# Patient Record
Sex: FEMALE | Race: WHITE | NOT HISPANIC OR LATINO | URBAN - METROPOLITAN AREA
[De-identification: names, ages, dates, MRNs, and addresses within clinical notes are randomized per-mention and may not be internally consistent; named-entity substitution may affect disease eponyms.]

---

## 2017-02-08 ENCOUNTER — OFFICE VISIT - HEALTHEAST (OUTPATIENT)
Dept: INTERNAL MEDICINE | Facility: CLINIC | Age: 32
End: 2017-02-08

## 2017-02-08 DIAGNOSIS — J01.00 ACUTE NON-RECURRENT MAXILLARY SINUSITIS: ICD-10-CM

## 2017-09-07 ENCOUNTER — OFFICE VISIT - HEALTHEAST (OUTPATIENT)
Dept: INTERNAL MEDICINE | Facility: CLINIC | Age: 32
End: 2017-09-07

## 2017-09-07 ENCOUNTER — RECORDS - HEALTHEAST (OUTPATIENT)
Dept: GENERAL RADIOLOGY | Facility: CLINIC | Age: 32
End: 2017-09-07

## 2017-09-07 DIAGNOSIS — J40 BRONCHITIS: ICD-10-CM

## 2017-09-07 DIAGNOSIS — J40 BRONCHITIS, NOT SPECIFIED AS ACUTE OR CHRONIC: ICD-10-CM

## 2018-01-19 ENCOUNTER — COMMUNICATION - HEALTHEAST (OUTPATIENT)
Dept: SCHEDULING | Facility: CLINIC | Age: 33
End: 2018-01-19

## 2018-01-19 ENCOUNTER — OFFICE VISIT - HEALTHEAST (OUTPATIENT)
Dept: INTERNAL MEDICINE | Facility: CLINIC | Age: 33
End: 2018-01-19

## 2018-01-19 DIAGNOSIS — J02.9 SORE THROAT: ICD-10-CM

## 2018-01-19 LAB — DEPRECATED S PYO AG THROAT QL EIA: ABNORMAL

## 2018-01-22 ENCOUNTER — RECORDS - HEALTHEAST (OUTPATIENT)
Dept: GENERAL RADIOLOGY | Facility: CLINIC | Age: 33
End: 2018-01-22

## 2018-01-22 ENCOUNTER — OFFICE VISIT - HEALTHEAST (OUTPATIENT)
Dept: INTERNAL MEDICINE | Facility: CLINIC | Age: 33
End: 2018-01-22

## 2018-01-22 ENCOUNTER — COMMUNICATION - HEALTHEAST (OUTPATIENT)
Dept: INTERNAL MEDICINE | Facility: CLINIC | Age: 33
End: 2018-01-22

## 2018-01-22 DIAGNOSIS — R05.9 COUGH: ICD-10-CM

## 2018-01-22 LAB
FLUAV AG SPEC QL IA: NORMAL
FLUBV AG SPEC QL IA: NORMAL

## 2018-01-23 ENCOUNTER — COMMUNICATION - HEALTHEAST (OUTPATIENT)
Dept: INTERNAL MEDICINE | Facility: CLINIC | Age: 33
End: 2018-01-23

## 2018-08-03 ENCOUNTER — OFFICE VISIT - HEALTHEAST (OUTPATIENT)
Dept: FAMILY MEDICINE | Facility: CLINIC | Age: 33
End: 2018-08-03

## 2018-08-03 DIAGNOSIS — M54.9 BACK PAIN: ICD-10-CM

## 2018-08-03 LAB
ALBUMIN UR-MCNC: NEGATIVE MG/DL
APPEARANCE UR: CLEAR
BASOPHILS # BLD AUTO: 0 THOU/UL (ref 0–0.2)
BASOPHILS NFR BLD AUTO: 1 % (ref 0–2)
BILIRUB UR QL STRIP: NEGATIVE
COLOR UR AUTO: YELLOW
EOSINOPHIL # BLD AUTO: 0.1 THOU/UL (ref 0–0.4)
EOSINOPHIL NFR BLD AUTO: 2 % (ref 0–6)
ERYTHROCYTE [DISTWIDTH] IN BLOOD BY AUTOMATED COUNT: 11.6 % (ref 11–14.5)
GLUCOSE UR STRIP-MCNC: NEGATIVE MG/DL
HCG UR QL: NEGATIVE
HCT VFR BLD AUTO: 39.4 % (ref 35–47)
HGB BLD-MCNC: 13.4 G/DL (ref 12–16)
HGB UR QL STRIP: NEGATIVE
KETONES UR STRIP-MCNC: NEGATIVE MG/DL
LEUKOCYTE ESTERASE UR QL STRIP: NEGATIVE
LYMPHOCYTES # BLD AUTO: 1.7 THOU/UL (ref 0.8–4.4)
LYMPHOCYTES NFR BLD AUTO: 36 % (ref 20–40)
MCH RBC QN AUTO: 31.2 PG (ref 27–34)
MCHC RBC AUTO-ENTMCNC: 34 G/DL (ref 32–36)
MCV RBC AUTO: 92 FL (ref 80–100)
MONOCYTES # BLD AUTO: 0.3 THOU/UL (ref 0–0.9)
MONOCYTES NFR BLD AUTO: 7 % (ref 2–10)
NEUTROPHILS # BLD AUTO: 2.6 THOU/UL (ref 2–7.7)
NEUTROPHILS NFR BLD AUTO: 55 % (ref 50–70)
NITRATE UR QL: NEGATIVE
PH UR STRIP: 7.5 [PH] (ref 5–8)
PLATELET # BLD AUTO: 294 THOU/UL (ref 140–440)
PMV BLD AUTO: 8.1 FL (ref 7–10)
RBC # BLD AUTO: 4.28 MILL/UL (ref 3.8–5.4)
SP GR UR STRIP: 1.01 (ref 1–1.03)
SP GR UR STRIP: 1.01 (ref 1–1.03)
UROBILINOGEN UR STRIP-ACNC: NORMAL
WBC: 4.7 THOU/UL (ref 4–11)

## 2018-08-04 LAB — BACTERIA SPEC CULT: NO GROWTH

## 2018-08-06 ENCOUNTER — COMMUNICATION - HEALTHEAST (OUTPATIENT)
Dept: SCHEDULING | Facility: CLINIC | Age: 33
End: 2018-08-06

## 2019-04-12 ENCOUNTER — ANESTHESIA - HEALTHEAST (OUTPATIENT)
Dept: OBGYN | Facility: CLINIC | Age: 34
End: 2019-04-12

## 2019-04-12 ENCOUNTER — SURGERY - HEALTHEAST (OUTPATIENT)
Dept: OBGYN | Facility: CLINIC | Age: 34
End: 2019-04-12

## 2019-04-12 ASSESSMENT — MIFFLIN-ST. JEOR: SCORE: 1549.97

## 2019-04-15 ENCOUNTER — HOME CARE/HOSPICE - HEALTHEAST (OUTPATIENT)
Dept: HOME HEALTH SERVICES | Facility: HOME HEALTH | Age: 34
End: 2019-04-15

## 2019-04-16 ENCOUNTER — COMMUNICATION - HEALTHEAST (OUTPATIENT)
Dept: OBGYN | Facility: CLINIC | Age: 34
End: 2019-04-16

## 2019-07-03 ENCOUNTER — OFFICE VISIT - HEALTHEAST (OUTPATIENT)
Dept: INTERNAL MEDICINE | Facility: CLINIC | Age: 34
End: 2019-07-03

## 2019-07-03 DIAGNOSIS — R10.84 ABDOMINAL PAIN, GENERALIZED: ICD-10-CM

## 2019-07-03 DIAGNOSIS — R19.7 DIARRHEA, UNSPECIFIED TYPE: ICD-10-CM

## 2019-07-03 LAB
ALBUMIN SERPL-MCNC: 4.3 G/DL (ref 3.5–5)
ALP SERPL-CCNC: 63 U/L (ref 45–120)
ALT SERPL W P-5'-P-CCNC: 33 U/L (ref 0–45)
ANION GAP SERPL CALCULATED.3IONS-SCNC: 10 MMOL/L (ref 5–18)
AST SERPL W P-5'-P-CCNC: 19 U/L (ref 0–40)
BASOPHILS # BLD AUTO: 0 THOU/UL (ref 0–0.2)
BASOPHILS NFR BLD AUTO: 1 % (ref 0–2)
BILIRUB SERPL-MCNC: 0.4 MG/DL (ref 0–1)
BUN SERPL-MCNC: 15 MG/DL (ref 8–22)
CALCIUM SERPL-MCNC: 10.6 MG/DL (ref 8.5–10.5)
CHLORIDE BLD-SCNC: 106 MMOL/L (ref 98–107)
CO2 SERPL-SCNC: 27 MMOL/L (ref 22–31)
CREAT SERPL-MCNC: 0.8 MG/DL (ref 0.6–1.1)
EOSINOPHIL # BLD AUTO: 0.1 THOU/UL (ref 0–0.4)
EOSINOPHIL NFR BLD AUTO: 2 % (ref 0–6)
ERYTHROCYTE [DISTWIDTH] IN BLOOD BY AUTOMATED COUNT: 11.6 % (ref 11–14.5)
GFR SERPL CREATININE-BSD FRML MDRD: >60 ML/MIN/1.73M2
GLUCOSE BLD-MCNC: 94 MG/DL (ref 70–125)
HCT VFR BLD AUTO: 43.3 % (ref 35–47)
HGB BLD-MCNC: 14.5 G/DL (ref 12–16)
LYMPHOCYTES # BLD AUTO: 2 THOU/UL (ref 0.8–4.4)
LYMPHOCYTES NFR BLD AUTO: 28 % (ref 20–40)
MCH RBC QN AUTO: 30.5 PG (ref 27–34)
MCHC RBC AUTO-ENTMCNC: 33.5 G/DL (ref 32–36)
MCV RBC AUTO: 91 FL (ref 80–100)
MONOCYTES # BLD AUTO: 0.5 THOU/UL (ref 0–0.9)
MONOCYTES NFR BLD AUTO: 7 % (ref 2–10)
NEUTROPHILS # BLD AUTO: 4.3 THOU/UL (ref 2–7.7)
NEUTROPHILS NFR BLD AUTO: 63 % (ref 50–70)
PLATELET # BLD AUTO: 289 THOU/UL (ref 140–440)
PMV BLD AUTO: 10.6 FL (ref 8.5–12.5)
POTASSIUM BLD-SCNC: 3.9 MMOL/L (ref 3.5–5)
PROT SERPL-MCNC: 7.5 G/DL (ref 6–8)
RBC # BLD AUTO: 4.75 MILL/UL (ref 3.8–5.4)
SODIUM SERPL-SCNC: 143 MMOL/L (ref 136–145)
WBC: 6.9 THOU/UL (ref 4–11)

## 2019-07-03 RX ORDER — CHLORAL HYDRATE 500 MG
2 CAPSULE ORAL DAILY
Status: SHIPPED | COMMUNITY
Start: 2019-07-03

## 2019-07-08 ENCOUNTER — COMMUNICATION - HEALTHEAST (OUTPATIENT)
Dept: INTERNAL MEDICINE | Facility: CLINIC | Age: 34
End: 2019-07-08

## 2019-07-08 LAB
GLIADIN IGA SER-ACNC: 1.2 U/ML
GLIADIN IGG SER-ACNC: <0.4 U/ML
IGA SERPL-MCNC: 196 MG/DL (ref 65–400)
TTG IGA SER-ACNC: 0.3 U/ML
TTG IGG SER-ACNC: <0.6 U/ML

## 2019-07-26 ENCOUNTER — OFFICE VISIT - HEALTHEAST (OUTPATIENT)
Dept: FAMILY MEDICINE | Facility: CLINIC | Age: 34
End: 2019-07-26

## 2019-07-26 ENCOUNTER — COMMUNICATION - HEALTHEAST (OUTPATIENT)
Dept: SCHEDULING | Facility: CLINIC | Age: 34
End: 2019-07-26

## 2019-07-26 DIAGNOSIS — N61.0 MASTITIS: ICD-10-CM

## 2019-08-28 ENCOUNTER — COMMUNICATION - HEALTHEAST (OUTPATIENT)
Dept: FAMILY MEDICINE | Facility: CLINIC | Age: 34
End: 2019-08-28

## 2020-12-07 ENCOUNTER — RECORDS - HEALTHEAST (OUTPATIENT)
Dept: ADMINISTRATIVE | Facility: OTHER | Age: 35
End: 2020-12-07

## 2021-05-27 NOTE — ANESTHESIA PROCEDURE NOTES
Spinal Block    Patient location during procedure: OR  Start time: 4/12/2019 10:11 AM  End time: 4/12/2019 10:15 AM  Reason for block: primary anesthetic    Staffing:  Performing  Anesthesiologist: Shant Troy MD    Preanesthetic Checklist  Completed: patient identified, risks, benefits, and alternatives discussed, timeout performed, consent obtained, airway assessed, oxygen available, suction available, emergency drugs available and hand hygiene performed  Spinal Block  Patient position: sitting  Prep: ChloraPrep and site prepped and draped  Patient monitoring: heart rate, cardiac monitor, continuous pulse ox and blood pressure  Approach: midline  Location: L3-4  Injection technique: single-shot  Needle type: pencil-tip   Needle gauge: 24 G    Assessment  Sensory level: T6    Additional Notes:  3cc 1% lidocaine skin, +csf 1st attempt

## 2021-05-27 NOTE — ANESTHESIA CARE TRANSFER NOTE
Last vitals:   Vitals:    04/12/19 1141   BP: 102/47   Pulse: 97   Resp: 12   Temp: 36.7  C (98.1  F)   SpO2: 99%     Patient's level of consciousness is awake  Spontaneous respirations: yes  Maintains airway independently: yes  Dentition unchanged: yes  Oropharynx: oropharynx clear of all foreign objects    QCDR Measures:  ASA# 20 - Surgical Safety Checklist: WHO surgical safety checklist completed prior to induction    PQRS# 430 - Adult PONV Prevention: 4558F - Pt received => 2 anti-emetic agents (different classes) preop & intraop  ASA# 8 - Peds PONV Prevention: NA - Not pediatric patient, not GA or 2 or more risk factors NOT present     PQRS# 424 - Lexy-op Temp Management: 4559F - At least one body temp DOCUMENTED => 35.5C or 95.9F within required timeframe  PQRS# 426 - PACU Transfer Protocol: - Transfer of care checklist used  ASA# 14 - Acute Post-op Pain: ASA14B - Patient did NOT experience pain >= 7 out of 10

## 2021-05-27 NOTE — ANESTHESIA PROCEDURE NOTES
Peripheral Block    Patient location during procedure: OR  Start time: 4/12/2019 11:19 AM  End time: 4/12/2019 11:26 AM  post-op analgesia per surgeon order as noted in medical record  Staffing:  Performing  Anesthesiologist: Shant Troy MD  Performing CRNA: Rios Peres CRNA  Preanesthetic Checklist  Completed: patient identified, site marked, risks, benefits, and alternatives discussed, timeout performed, consent obtained, at patient's request, airway assessed, oxygen available, suction available, emergency drugs available and hand hygiene performed  Peripheral Block  Block type: other, TAP  Prep: ChloraPrep  Patient position: supine  Patient monitoring: cardiac monitor, continuous pulse oximetry, heart rate and blood pressure  Laterality: bilateral, same technique used bilaterally  Injection technique: ultrasound guided    Ultrasound used to visualize needle placement in proximity to nerve being blocked: yes   Permanent ultrasound image captured for medical record  Sterile gel and probe cover used for ultrasound.    Needle  Needle type: echogenic   Needle gauge: 20G  Needle length: 6 in  no peripheral nerve catheter placed  Assessment  Injection assessment: no difficulty with injection, negative aspiration for heme, no paresthesia on injection and incremental injection

## 2021-05-27 NOTE — TELEPHONE ENCOUNTER
:   N/A    Language:   English    Discharge Follow-Up:  Follow-Up call by Outreach nurse: Call placed    Type of Delivery:      Feeding Method:  Breastfeeding    Feedings in 24Hrs:  >8x/Day    Breast engorgement:   Yes    Nipple tenderness:   Yes    Non-nursing engorgement discussed:   N/A    Amount of Feeding:  None    Number of Infant Stools in 24 hours:   >3    Stool:  Transition    Number of Infant voids in 24 hours:  >3    Urine:  Clear    Infant Jaundice:   None    Discussed increasing s/s of Jaundice:   Yes    Circumcision:   N/A    Maternal s/s of infection:   None    Maternal s/s of PIH:   WDL    Postpartum cramping:   Mild    Comfort:  Adequate with routine pain meds    Vaginal Bleeding:  WDL    S/S of Maternal Thrombosis:  None    Maternal BM Since Delivery:  No taking encourage to take  stool softener increase fluid. Pt has not been taking stool softener. Has been up and moving around.    Referrals:   None    Resources Used During Phone Call:  HEPS    Comments:   Pt/mom states things are going well overall. Mom states that her rash on her abd has not worsened and does not itch. Has chosen to hold of on using hydrocortisone cream on it since it is not bothering her. States bf is going well. Milk is in and NB is latching well. Nipples are sore but pt is using nipple cream and states that is helping. NB has a fu apt tomorrow. No further concerns noted. Edu discussed. Questions answered.    Completed by:   Marlen Woodall RN

## 2021-05-27 NOTE — ANESTHESIA POSTPROCEDURE EVALUATION
Patient: Marisabel Healy  REPEAT  SECTION  Anesthesia type: spinal    Patient location: PACU  Last vitals:   Vitals Value Taken Time   /60 2019 12:25 PM   Temp 36.7  C (98.1  F) 2019 11:41 AM   Pulse 76 2019 12:25 PM   Resp 14 2019 12:25 PM   SpO2 97 % 2019 12:25 PM     Post vital signs: stable  Level of consciousness: awake and responds to simple questions  Post-anesthesia pain: pain controlled  Post-anesthesia nausea and vomiting: no  Pulmonary: unassisted, return to baseline  Cardiovascular: stable and blood pressure at baseline  Hydration: adequate  Anesthetic events: no    QCDR Measures:  ASA# 11 - Lexy-op Cardiac Arrest: ASA11B - Patient did NOT experience unanticipated cardiac arrest  ASA# 12 - Lexy-op Mortality Rate: ASA12B - Patient did NOT die  ASA# 13 - PACU Re-Intubation Rate: NA - No ETT / LMA used for case  ASA# 10 - Composite Anes Safety: ASA10A - No serious adverse event    Additional Notes:

## 2021-05-27 NOTE — ANESTHESIA PREPROCEDURE EVALUATION
Anesthesia Evaluation      Patient summary reviewed   No history of anesthetic complications     Airway   Mallampati: II  Neck ROM: full   Pulmonary     breath sounds clear to auscultation  (-) pneumonia, shortness of breath, recent URI, sleep apnea, not a smoker                         Cardiovascular   Exercise tolerance: > or = 4 METS  (-) angina  Rhythm: regular  Rate: normal,         Neuro/Psych - negative ROS   (-) no seizures, no neuromuscular disease, no CVA    Endo/Other    (+) pregnant  (-) no diabetes     GI/Hepatic/Renal            Dental    (+) caps                       Anesthesia Plan  Planned anesthetic: spinal  duramorph  TAP blocks for postop pain per surgeon request   ASA 2   Induction: intravenous   Anesthetic plan and risks discussed with: patient  Anesthesia plan special considerations: rapid sequence induction, antiemetics,   Post-op plan: routine recovery

## 2021-05-30 NOTE — PATIENT INSTRUCTIONS - HE
Basic labs today.    Lab test for celiac disease as well.    Results will be made available to you on my chart.  If there is something serious, we will call.    Use 1 scoop of Metamucil in 8 ounce glass of water every morning.    If symptoms do not begin to improve after 2 weeks, we will refer you to [facility] for abdominal pain work-up and food allergy testing.

## 2021-05-30 NOTE — PROGRESS NOTES
Clinic Note    Assessment:     Assessment and Plan:  1. Abdominal pain, generalized    - LTD-Gluten(Celiac)Antibody Panel  - Comprehensive Metabolic Panel  - HM1(CBC and Differential)  - Celiac(Gluten)Antibody Panel  - HM1 (CBC with Diff)    2. Diarrhea, unspecified type    - LTD-Gluten(Celiac)Antibody Panel  - Comprehensive Metabolic Panel  - HM1(CBC and Differential)  - Celiac(Gluten)Antibody Panel  - HM1 (CBC with Diff)       Patient Instructions   Basic labs today.    Lab test for celiac disease as well.    Results will be made available to you on my chart.  If there is something serious, we will call.    Use 1 scoop of Metamucil in 8 ounce glass of water every morning.    If symptoms do not begin to improve after 2 weeks, we will refer you to [facility] for abdominal pain work-up and food allergy testing.    Return in about 2 weeks (around 7/17/2019) for If symptoms do not start to improve in two weeks..         Subjective:      Patient comes to clinic today for abdominal pain.    She has had some nausea and diarrhea symptoms for the last 4 months or so.  She has tried cutting out her gluten as she seems to think that she may have a gluten sensitivity.  This seems to help.    For example, 2 days ago she had a full bowl of cereal with milk which seemed to exacerbate her abdominal pain, nausea, and diarrhea.    She is also tried cutting down on dairy which seems to help.  She is never been tested for celiac disease.    She denies any black tarry stools.  No vomiting.  No fevers.  No rashes, welts, or hives.    No dysuria.      The following portions of the patient's history were reviewed and updated as appropriate: Allergies, medications, problems, prior note.    Review of Systems:    Review is otherwise negative except for what is mentioned above.     Social Hx:    Social History     Tobacco Use   Smoking Status Never Smoker   Smokeless Tobacco Never Used         Objective:     Vitals:     19 1556   BP: 122/70   Pulse: 64   Weight: 162 lb (73.5 kg)       Exam:    General: No apparent distress. Calm. Alert and Oriented X3. Pt behavior is appropriate.  Head:Atraumatic. Normocephalic, non-tender to palpation  Neck: Supple. No JVD. Full ROM. No adenopathy  Eyes: PERRL, No discharge. No strabismus. No nystagmus.  Ears: TMs pearly gray with landmarks visible.   Nose/Mouth/Throat: Patent nares, no oral lesions, pharynx clear and without exudate. Uvula mid-line. Nasal septum mid-line. Clear turbinates.   Lymph: No axillar or cervical adenopathy.   Chest/Lungs: Normal chest wall, clear to auscultation, normal respiratory effort and rate.   Heart/Pulses: Regular rate and rhythm, strong and equal radial pulses, no murmurs. Capillary refill <2 seconds. No edema.   Abdomen: Soft, no palpable masses. No hepatosplenomegaly, no tenderness with palpation noted. Bowel sounds active in all quadrants. No increased tympany.   Genitalia: Not examined.   Musculoskeletal: No CVA tenderness with palpation. Good ROM with extremities.   Neurologic: Interactive, alert, no focal findings, CNs intact.   Skin: Warm, dry. Normal hair pattern. Free of lesions. Normal skin turgor.       Patient Active Problem List   Diagnosis     Abdominal Pain     Lymphadenopathy     Breast Pain     Previous  delivery, antepartum condition or complication      delivery delivered     Elderly multigravida in third trimester     Current Outpatient Medications   Medication Sig Dispense Refill     ergocalciferol (VITAMIN D2) 50,000 unit capsule Take 50,000 Units by mouth every 7 days.       omega-3/dha/epa/fish oil (FISH OIL-OMEGA-3 FATTY ACIDS) 300-1,000 mg capsule Take 2 g by mouth daily.       prenatal vitamin iron-folic acid 27mg-0.8mg (PRENATAL S) 27 mg iron- 800 mcg Tab tablet Take 1 tablet by mouth daily.       ibuprofen (ADVIL,MOTRIN) 800 MG tablet Take 1 tablet (800 mg total) by mouth every 8 (eight) hours as needed for  pain. 15 tablet 0     oxyCODONE (ROXICODONE) 5 MG immediate release tablet Take 1-2 tablets (5-10 mg total) by mouth every 6 (six) hours as needed. 13 tablet 0     No current facility-administered medications for this visit.            Aureliano Morrell (Rob), CNP    7/3/2019

## 2021-05-30 NOTE — PROGRESS NOTES
Impression:  Mastitis right breast    Plan:  Continue nursing and pumping to empty the breast, Keflex for 7 days, she is worried she might get a yeast infection and will be traveling so gave her a prescription for some fluconazole if that should happen.  Drink plenty of liquids, Tylenol for discomfort.  Warm packs as needed, return if worsening pain fever or other problems      Chief Complaint:  Chief Complaint   Patient presents with     Mastitis     R breast pains x 24 hours, patient is leaving for [place] in 2 days, possibly brought on by stress          HPI:   Marisabel Healy is a 33 y.o. female who presents to this clinic for the evaluation of breast tenderness.  The patient complains of 24 hours of tenderness of the lower outer quadrant of the right breast.  She is nursing.  She is noticed some redness in the last night felt warm may have had a fever but did not check her temperature.  No other rash or painful areas.  The pain is moderate and worse over 24 hours      PMH:   Past Medical History:   Diagnosis Date     Gallstones      MTHFR mutation (H)      Varicose veins during pregnancy, antepartum      Ventral hernia      Past Surgical History:   Procedure Laterality Date      SECTION        SECTION N/A 2019    Procedure: REPEAT  SECTION;  Surgeon: Chris Han MD;  Location: [facility] OR;  Service: Obstetrics     HERNIA REPAIR       LAPAROSCOPIC CHOLECYSTECTOMY N/A 11/3/2014    Procedure: CHOLECYSTECTOMY LAPAROSCOPIC;  Surgeon: Ignacio King MD;  Location: [facility] OR;  Service:      DE REPAIR INCISIONAL HERNIA,ANA ROSA N/A 2014    Procedure: VENTRAL HERNIA REPAIR WITH MESH;  Surgeon: Ignacio King MD;  Location: [facility] OR;  Service: General     TONSILLECTOMY       VARICOSE VEIN SURGERY  2015     WISDOM TOOTH EXTRACTION           ROS:  All other systems negative    Meds:    Current Outpatient Medications:      ergocalciferol  (VITAMIN D2) 50,000 unit capsule, Take 50,000 Units by mouth every 7 days., Disp: , Rfl:      omega-3/dha/epa/fish oil (FISH OIL-OMEGA-3 FATTY ACIDS) 300-1,000 mg capsule, Take 2 g by mouth daily., Disp: , Rfl:      prenatal vitamin iron-folic acid 27mg-0.8mg (PRENATAL S) 27 mg iron- 800 mcg Tab tablet, Take 1 tablet by mouth daily., Disp: , Rfl:      cephalexin (KEFLEX) 250 MG capsule, Take 1 capsule (250 mg total) by mouth 4 (four) times a day for 7 days., Disp: 28 capsule, Rfl: 0     fluconazole (DIFLUCAN) 150 MG tablet, Take 1 tablet (150 mg total) by mouth once for 1 dose., Disp: 2 tablet, Rfl: 0        Social:  Social History     Socioeconomic History     Marital status:      Spouse name: Not on file     Number of children: Not on file     Years of education: Not on file     Highest education level: Not on file   Occupational History     Not on file   Social Needs     Financial resource strain: Not on file     Food insecurity:     Worry: Not on file     Inability: Not on file     Transportation needs:     Medical: Not on file     Non-medical: Not on file   Tobacco Use     Smoking status: Never Smoker     Smokeless tobacco: Never Used   Substance and Sexual Activity     Alcohol use: Yes     Alcohol/week: 1.2 oz     Types: 2 Glasses of wine per week     Comment: per week     Drug use: No     Sexual activity: Yes     Partners: Male   Lifestyle     Physical activity:     Days per week: Not on file     Minutes per session: Not on file     Stress: Not on file   Relationships     Social connections:     Talks on phone: Not on file     Gets together: Not on file     Attends Rastafarian service: Not on file     Active member of club or organization: Not on file     Attends meetings of clubs or organizations: Not on file     Relationship status: Not on file     Intimate partner violence:     Fear of current or ex partner: Not on file     Emotionally abused: Not on file     Physically abused: Not on file     Forced  sexual activity: Not on file   Other Topics Concern     Not on file   Social History Narrative     Not on file         Physical Exam:  Sitting up in a chair no distress right breast shows erythema and tenderness in the lower outer quadrant.  There is no fluctuance or induration.  Right breast is otherwise normal      Results:    No results found for this or any previous visit (from the past 24 hour(s)).    No results found.      Maverick Chand MD

## 2021-05-30 NOTE — TELEPHONE ENCOUNTER
Call from pt       Declined triage  - would like appt for possible mastitis         No hard lump   No fever per se - maybe a bit warm though Applying cold compresses           A/P:   > Appt at [facility] for later this afternoon   > She will try [facility] first     Steffen Gamboa, RN   Triage and Medication Refills

## 2021-05-31 ENCOUNTER — RECORDS - HEALTHEAST (OUTPATIENT)
Dept: ADMINISTRATIVE | Facility: CLINIC | Age: 36
End: 2021-05-31

## 2021-05-31 VITALS — BODY MASS INDEX: 23.08 KG/M2 | WEIGHT: 154 LBS

## 2021-05-31 VITALS — WEIGHT: 154 LBS | BODY MASS INDEX: 23.08 KG/M2

## 2021-05-31 VITALS — WEIGHT: 153.8 LBS | BODY MASS INDEX: 23.05 KG/M2

## 2021-06-01 ENCOUNTER — RECORDS - HEALTHEAST (OUTPATIENT)
Dept: ADMINISTRATIVE | Facility: CLINIC | Age: 36
End: 2021-06-01

## 2021-06-01 VITALS — BODY MASS INDEX: 22.78 KG/M2 | WEIGHT: 152 LBS

## 2021-06-03 VITALS — WEIGHT: 162 LBS | BODY MASS INDEX: 24.63 KG/M2

## 2021-06-03 VITALS — HEIGHT: 68 IN | WEIGHT: 180 LBS | BODY MASS INDEX: 27.28 KG/M2

## 2021-06-03 VITALS — BODY MASS INDEX: 24.04 KG/M2 | WEIGHT: 158.1 LBS

## 2021-06-08 NOTE — PROGRESS NOTES
[facility] Clinic Visit  Patient Name: Marisabel Healy  Patient Age: 31 y.o.  YOB: 1985  MRN: [id number]  ?  Date of Visit: 2017  Reason for Office Visit:   Chief Complaint   Patient presents with     chest congestion       HPI: Marisabel Healy 31 y.o. female who presents to clinic for URI symptoms on and off for the past 2 weeks with increasing sinus pressure + pain for past week. Green thick discharge. Mild dry cough, post nasal drip. No fevers. + chills. Mild sore throat and chest congestion.     Review of Systems: As noted in HPI     Current Scheduled Meds:  Outpatient Encounter Prescriptions as of 2017   Medication Sig Dispense Refill     [] azithromycin (ZITHROMAX Z-AGUILAR) 250 MG tablet Take 1 tablet (250 mg total) by mouth daily for 5 days. Take 500 mg (2 x 250 mg tablets) on day 1 followed by 250 mg (1 tablet) on days 2-5. 6 tablet 0     [] fluconazole (DIFLUCAN) 150 MG tablet Take 1 tablet (150 mg total) by mouth once for 1 dose. 1 tablet 0     No facility-administered encounter medications on file as of 2017.      Past Medical History:   Diagnosis Date     Gallstones      Ventral hernia      Past Surgical History:   Procedure Laterality Date      SECTION       HERNIA REPAIR       LAPAROSCOPIC CHOLECYSTECTOMY N/A 11/3/2014    Procedure: CHOLECYSTECTOMY LAPAROSCOPIC;  Surgeon: Ignacio King MD;  Location: [facility];  Service:      MD REPAIR INCISIONAL HERNIA,ANA ROSA N/A 2014    Procedure: VENTRAL HERNIA REPAIR WITH MESH;  Surgeon: Ignacio King MD;  Location: [facility];  Service: General     TONSILLECTOMY       Social History   Substance Use Topics     Smoking status: Never Smoker     Smokeless tobacco: None     Alcohol use 1.2 oz/week     2 Glasses of wine per week      Comment: per week       Objective / Physical Examination:  Visit Vitals     /64     Pulse 62     Temp 98  F (36.7  C) (Oral)     Breastfeeding No     Wt  Readings from Last 3 Encounters:   01/12/16 165 lb 6.4 oz (75 kg)   10/28/14 168 lb (76.2 kg)   09/28/14 169 lb (76.7 kg)     There is no height or weight on file to calculate BMI. (>25?)    General Appearance: Alert and oriented in no acute distress  Head: frontal sinuses tender to percussion.   Ears: Tympanic membrane clear with landmarks well visualized bilaterally  Eyes: conjunctivae clear   Nose: Septum midline, nares patent, mucosa moist and without drainage  Throat: pharynx without erythema or exudate  Neck: Mild anterior cervical adenopathy.  Lungs: Clear to auscultation bilaterally. Normal inspiratory and expiratory effort  Cardiovascular: RRR S1, S2  Integumentary: Warm and dry    Assessment / Plan / Medical Decision Making:      Encounter Diagnoses   Name Primary?     Acute non-recurrent maxillary sinusitis Yes        1. Acute non-recurrent maxillary sinusitis    Given duration of sxs will treat with antibiotics.   She frequently will get a yeast infection after antibiotics and provided a script for diflucan in case this occurs.   Recommend a daily probiotic during and after antibiotics  Continue with sinus rinses, warm compresses, humidifier     - azithromycin (ZITHROMAX Z-AGUILAR) 250 MG tablet; Take 1 tablet (250 mg total) by mouth daily for 5 days. Take 500 mg (2 x 250 mg tablets) on day 1 followed by 250 mg (1 tablet) on days 2-5.  Dispense: 6 tablet; Refill: 0  - fluconazole (DIFLUCAN) 150 MG tablet; Take 1 tablet (150 mg total) by mouth once for 1 dose.  Dispense: 1 tablet; Refill: 0    Return if worsening or not improving     Adalberto Justin MD  [facility]

## 2021-06-12 NOTE — PROGRESS NOTES
ASSESSMENT and PLAN:  1. Bronchitis  We got an xray.  I reviewed the radiologist's report and it's clear.  Given the duration and severity of her sx, I think it's reasonable to give her course of antibiotics.  I sent in doxy for her.  I also sent in tessalon perles.  I sent in an albuterol inhaler w/ a spacer, but she told my CA that she didn't want it b/c she already had one.  We did also advise her that abx can make OCPs ineffective; she uses condoms so that's not a concern for her.  If she doesn't improve w/ this treatment, would consider PFTs and CT chest.  - XR Chest PA and Lateral; Future    There are no discontinued medications.    No Follow-up on file.    There are no Patient Instructions on file for this visit.    CHIEF COMPLAINT:  Chief Complaint   Patient presents with     Cough     Bronchitis x 4 weeks, symptoms still present. Was put on Z-sushma at [facility], did not help. Chest pains, hard breathing       HISTORY OF PRESENT ILLNESS:  Marisabel Healy is a 31 y.o. female  presenting to the clinic today for on-going cough.  She was seen at the [facility] intially.  She had a deep congested.  She was treated w/ a zpak.  She took it, but didn't feel that she got any relief.  She sees the same provider there--the clinic is easy for her to access.  She has a great relationship w/ the provider there.  She waited about a week and still didn't get better.  She was going to the [facility] again, but her provider there told her that she might have pneumonia and would likely need an xray (and they don't have xray there).  She had a low grade fever at the beginning of her illness (100-100.5).  She hasn't had a fever now, but recently work up one night damp from sweat.  It feels tight when she tries to take a breath.  Her chest muscles are sore from coughing.  She's very fatigued.  She is just finishing her period, so is not pregnant.  Her cough can be productive, but she doesn't usually have the opportunity  to expel it and see what it looks like.  She doesn't have a history of asthma.  She is a non-smoker.      TOBACCO USE:  History   Smoking Status     Never Smoker   Smokeless Tobacco     Not on file       VITALS:  Vitals:    09/07/17 0949   BP: 110/74   Patient Site: Right Arm   Patient Position: Sitting   Cuff Size: Adult Regular   Pulse: (!) 59   Temp: 98.5  F (36.9  C)   TempSrc: Oral   SpO2: 100%   Weight: 153 lb 12.8 oz (69.8 kg)     Wt Readings from Last 3 Encounters:   09/07/17 153 lb 12.8 oz (69.8 kg)   01/12/16 165 lb 6.4 oz (75 kg)   10/28/14 168 lb (76.2 kg)         PHYSICAL EXAM:  Constitutional:  Reveals an alert, pleasant adult female, NAD but looks mildly fatigued  Vitals:  Noted.   Throat: Lips, mucosa, and tongue normal; teeth and gums normal   Neck: Normal ROM, small non-tender LN under her left ear (not new per pt)  Lungs: Clear to auscultation bilaterally, respirations unlabored, no wheezing, but when she coughed it sounded very tight   Heart: Regular rate and rhythm, S1 and S2 normal, no murmur, rub, or gallop,     MEDICATIONS:  Current Outpatient Prescriptions   Medication Sig Dispense Refill     albuterol (PROAIR HFA;PROVENTIL HFA;VENTOLIN HFA) 90 mcg/actuation inhaler Inhale 2 puffs every 6 (six) hours as needed for wheezing. 18 g 0     benzonatate (TESSALON PERLES) 100 MG capsule Take 1 capsule (100 mg total) by mouth every 6 (six) hours as needed for cough. 30 capsule 1     doxycycline (VIBRA-TABS) 100 MG tablet Take 1 tablet (100 mg total) by mouth 2 (two) times a day for 10 days. 20 tablet 0     inhalational spacing device Spcr Use with albuterol inhaler 1 each 0     No current facility-administered medications for this visit.

## 2021-06-15 NOTE — PROGRESS NOTES
Clinic Note    Assessment:     Assessment and Plan:    1. Sore throat  Rapid strep is positive today.  Due to her concomitant respiratory complaints we will send her with azithromycin today.  See instructions below for more details.  She told me that when she uses antibiotics she frequently gets yeast infections, I sent in for prescription of Diflucan in the event that she starts to have such symptoms.    - Rapid Strep A Screen-Throat       Patient Instructions   Take two tablets of the azithromycin on day one, followed by one tablet on days 2-5.    Take Dayquil and Nyquil for symptoms.     Stay hydrated and drink plenty of fluids; gatorade and chicken noodle soup are good.     Monitor how you are feeling over the next few days, if you have trouble breathing seek urgent care.              Subjective:      Marisabel Healy is a 32 y.o. female who comes to the clinic with a sore throat.     Symptoms started about 4 days ago. Symptoms did not seem to bad at first; yesterday was really bad. Pain is really severe. She feels like an elephant is sitting on her chest. She started coughing this morning. No history of asthma, youngest son has asthma. Used albuterol in the past, which seemed to help a little. Having a little big of sinus congestion. Post nasal drip. No ear pain. No fevers. Having some nausea, no vomiting. No diarrhea.  No wheezing.    The following portions of the patient's history were reviewed and updated as appropriate: Allergies. Medications, problem list.     Review of Systems:    Review is negative except for what is mentioned above.     Social Hx:    History   Smoking Status     Never Smoker   Smokeless Tobacco     Never Used         Objective:     Vitals:    01/19/18 0752   BP: 118/70   Pulse: 86   Temp: 98.4  F (36.9  C)   SpO2: 93%   Weight: 154 lb (69.9 kg)       Exam:    General: Mild distress. Calm. Alert and Oriented X3. Pt behavior is appropriate.  Head:Atraumatic. Normocephalic, non-tender to  palpation  Neck: Supple. No JVD. Full ROM.  Cervical adenopathy present bilaterally.  Eyes: PERRL, No discharge. No strabismus. No nystagmus.  Ears: TMs pearly gray with landmarks visible.   Nose/Mouth/Throat: Patent nares, no oral lesions, pharynx erythematous with exudates present. Uvula mid-line. Nasal septum mid-line. Clear turbinates.   Lymph: No axillar adenopathy.   Chest/Lungs: Normal chest wall, clear to auscultation, normal respiratory effort and rate.   Heart/Pulses: Regular rate and rhythm, strong and equal radial pulses, no murmurs. Capillary refill <2 seconds. No edema.   Neurologic: Interactive, alert, no focal findings, CNs intact.   Skin: Warm, dry. Normal hair pattern. Free of lesions. Normal skin turgor.       Patient Active Problem List   Diagnosis     Abdominal Pain     Lymphadenopathy     Breast Pain     No current outpatient prescriptions on file.     No current facility-administered medications for this visit.        Total time spent with patient was 20 minutes with >50% of time spent in face-to-face counseling regarding the above plan       Aureliano Morrell (Rob), MAGY    1/19/2018

## 2021-06-15 NOTE — PROGRESS NOTES
Clinic Note    Assessment:     Assessment and Plan:    1. Cough  Rapid influenza is negative today.  Will get a chest x-ray to rule out pneumonia.  Her vital signs are stable and she is afebrile which is reassuring.  She has vague nonspecific complaints of shortness of breath, I think this could be related to some sort of viral URI or perhaps a resolving rapid strep.  Either way I will call her with preliminary results of the x-ray today.  - Influenza A/B Rapid Test       Patient Instructions   Your rapid influenza is negative today. We will get a chest x-ray to rule out pneumonia.     We will call you with the results of the chest x-ray; this should results today, but at latest it will be tomorrow morning. IF you have pneumonia we will start you on antibiotics.     You may be dealing with a nasty virus. If that is the case, use 1000 mg of tylenol every six hours for the next 3-4 days.     Stay hydrated.              Subjective:      Marisabel Healy is a 32 y.o. female who comes to the clinic today with chills night sweats and a cough.  I saw the patient in the clinic a few days ago and diagnosed her with strep throat and started her on azithromycin.  At that time the patient was saying that she was having some mild shortness of breath symptoms, today she says that her shortness of breath seems to be worse.  She says that she has a mild cough, tends to be somewhat productive.  No chest pain.  Sore throat has resolved since last week.  Says that at night she wakes up drenched in sweat.  Has small children at home that have been sick.   has been sick as well.  Has been taking Tylenol as needed for chills and aches.  Last week had some vomiting with the antibiotics.    The following portions of the patient's history were reviewed and updated as appropriate: Allergies, medications, problem list.    Review of Systems:    Review is negative except for what is mentioned above.     Social Hx:    History   Smoking  Status     Never Smoker   Smokeless Tobacco     Never Used         Objective:     Vitals:    01/22/18 1305   BP: 115/70   Pulse: 92   Temp: 98.2  F (36.8  C)   SpO2: 99%   Weight: 154 lb (69.9 kg)       Exam:    General: No apparent distress.  Anxious. Alert and Oriented X3. Pt behavior is appropriate.  Head:Atraumatic. Normocephalic, non-tender to palpation  Neck: Supple. No JVD. Full ROM. No adenopathy  Eyes: PERRL, No discharge. No strabismus. No nystagmus.  Ears: TMs pearly gray with landmarks visible.   Nose/Mouth/Throat: Patent nares, no oral lesions, erythematous but without exudate. Uvula mid-line. Nasal septum mid-line. Clear turbinates.   Lymph: No axillar or cervical adenopathy.   Chest/Lungs: Normal chest wall, clear to auscultation, normal respiratory effort and rate.   Heart/Pulses: Regular rate and rhythm, strong and equal radial pulses, no murmurs. Capillary refill <2 seconds. No edema.   Neurologic: Interactive, alert, no focal findings, CNs intact.   Skin: Warm, dry. Normal hair pattern. Free of lesions. Normal skin turgor.       Patient Active Problem List   Diagnosis     Abdominal Pain     Lymphadenopathy     Breast Pain     Current Outpatient Prescriptions   Medication Sig Dispense Refill     azithromycin (ZITHROMAX) 250 MG tablet Take 500 mg (2 x 250 mg tablets) on day 1 followed by 250 mg (1 tablet) on days 2-5. 6 tablet 0     No current facility-administered medications for this visit.        Total time spent with patient was 20 minutes with >50% of time spent in face-to-face counseling regarding the above plan       Aureliano Morrell CNP (Rob)    1/22/2018

## 2021-06-16 PROBLEM — O09.523 ELDERLY MULTIGRAVIDA IN THIRD TRIMESTER: Status: ACTIVE | Noted: 2019-04-12

## 2021-06-16 PROBLEM — O34.219 PREVIOUS CESAREAN DELIVERY, ANTEPARTUM CONDITION OR COMPLICATION: Status: ACTIVE | Noted: 2019-04-12

## 2021-06-17 NOTE — PATIENT INSTRUCTIONS - HE
Patient Instructions by Maverick Chand MD at 7/26/2019 10:20 AM     Author: Maverick Chand MD Service: -- Author Type: Physician    Filed: 7/26/2019 10:43 AM Encounter Date: 7/26/2019 Status: Signed    : Maverick Chand MD (Physician)         Patient Education     Mastitis  Mastitis occurs when breast tissue becomes swollen and inflamed. This is almost always due to infection. Mastitis most often affects breastfeeding women during the first 6 weeks after childbirth. For this reason, its also known as lactation mastitis. Infection may happen after a duct becomes clogged, causing milk to back up in the breast. Mastitis may also occur if bacteria enter the breast through small cracks in the nipple. (Less often, mastitis occurs in women who arent breastfeeding. If you have mastitis that is not due to breastfeeding, your healthcare provider will give you more information as needed. Treatment may include some of the same home care measures listed below.)  Mastitis may cause flu-like symptoms such as fever, aches, and fatigue. The affected breast may feel painful, warm, tender, firm, or swollen. The skin over the breast may be red (often in a wedge-shaped pattern). You may feel a burning a sensation when breastfeeding.  In most cases, mastitis can be treated with antibiotics. This should clear the infection. If treatment is delayed, a pocket of pus (abscess) can form in the breast tissue. A procedure may then be needed to drain the pus. In severe cases of infection, other treatments may be needed.  Home care  Breastfeeding    Its very important to keep the milk flowing from the infected breast. Continue breastfeeding from both breasts as usual. This will not hurt the baby. If this is too painful, use a breast pump to remove milk from the infected side. This can be fed to your baby or discarded. Note: If you don't continue to breastfeed or pump your breast, bacteria can grow in the milk that is left in your  breast. This can make your infection worse.    Tell your healthcare provider if you have problems with breastfeeding. He or she may suggest changes to your technique, if needed. You may also be referred to a lactation nurse or consultant for support with breastfeeding.  General care    Take any medicines youre prescribed as directed. If youre taking antibiotics, be sure to complete all of the medicine even if you start to feel better. Over-the-counter pain medicines may also be recommended. Dont use breast creams or other products or medicines without talking to your healthcare provider first. Note: If youre concerned about taking medicines while breastfeeding, talk to your healthcare provider.    Rest as often as needed. Also be sure to drink plenty of fluids.    To help relieve pain and swelling, heat or ice may be used. Apply as often as directed by your provider.  ? Heat: Place a warm compress on the breast. Use a towel soaked in hot water, a heating pad, or a hot water bottle.  ? Cold: Place a cold compress on the breast. Use an ice pack or bag of ice wrapped in a thin towel. Never place a cold source directly on the skin.  Follow-up care  Follow up with your healthcare provider as advised.  When to seek medical advice  Call your healthcare provider right away if any of these occur:    Fever of 100.4 F (38 C) or higher, or as directed by your provider    Shaking chills    Symptoms worsen or do not improve within 48 to 72 hours of starting treatment    New symptoms develop  Date Last Reviewed: 7/30/2015 2000-2017 [employer]. [address] [place] [address], [place] [address]. All rights reserved. This information is not intended as a substitute for professional medical care. Always follow your healthcare professional's instructions.

## 2021-06-19 NOTE — PROGRESS NOTES
Assessment/Plan:    Back pain/urinary urgency  Episodic but persistent low back pain with mild urinary urgency/frequency and abdominal bloating.  UPT negative, CBC normal, urine clear. Culture pending. Possible UTI/pyelo vs musculoskeletal strain from work out program or other intestinal gas issues. Similar sxs have resolved with antibiotic for UTI in the past.   - Urinalysis-UC if Indicated  - Pregnancy (Beta-hCG, Qual), Urine  - HM1(CBC and Differential)  - Culture, Urine  - HM1 (CBC with Diff)  - sulfamethoxazole-trimethoprim (SEPTRA DS) 800-160 mg per tablet; Take 1 tablet by mouth 2 (two) times a day for 7 days.  Dispense: 14 tablet; Refill: 0    Fluids, rest  Ice to low back  Gentle stretching  Urine culture pending.   Bactrim DS empirically for urinary sxs as this has worked for similar symptoms in the past and she is headed out of town.   Follow up as needed.     Subjective:      Marisabel Healy is a 32 y.o. female who presents with low back pain.  This has been fluctuating for a couple weeks, bialteral or around to the front.  Associated at times with a sensation of bloating. She has had increased feeling of needing to void, increased frequency and urgency. No dysuria or blood in urine. No change in bowel movements. No fever or chills.  She has noticed some decreased in appetite and energy. She has had similar symptoms in the past and was found to have a UTI - treatment with bactrim resolved the symptoms.  She had some oral surgery and was treated with penicillin earlier in July. No post operative problems at that time.  She has been participating in a workout program for the last 10-12 weeks and can't tell if the back pain is related to strain or the workouts. No rash. No vomiting or nausea, no vaginal discharge, LMP normal and there was no change in the back pain or urinary sxs or bloating before or after her period on 7/26/18.  has had vasectomy. Is s/p lap suze and c/section.  Has had hernia  repair. No URI or cough or ST.  No known exposures.  No chronic health problems or routine medications.  She is headed out of town later today and worried about getting worse over the weekend.     Allergies   Allergen Reactions     Metoclopramide Hcl Other (See Comments)     Anxiety, cloudy feeling.     No current outpatient prescriptions on file prior to visit.     No current facility-administered medications on file prior to visit.      Past Medical History:   Diagnosis Date     Gallstones      Ventral hernia        Objective:     /66 (Patient Site: Right Arm, Patient Position: Sitting, Cuff Size: Adult Regular)  Pulse (!) 55  Temp 98  F (36.7  C) (Oral)   Resp 16  Wt 152 lb (68.9 kg)  LMP 07/26/2018  SpO2 100%  Breastfeeding? No  BMI 22.78 kg/m2    Physical  General Appearance: Alert, cooperative, no distress  Head: Normocephalic, without obvious abnormality, atraumatic  Eyes: Conjunctivae are normal.  Nose: No significant congestion.  Lungs: Clear to auscultation bilaterally, respirations unlabored  Heart: Regular rate and rhythm  Abdomen: Soft, non-distended, normal bowel sounds, non-tender, no masses, no organomegaly.  No CVA tenderness with percussion. Mild low umbar muscle soreness with palpation  Extremities: No lower extremity edema or calf tenderness  Skin: Skin color, texture, turgor normal, no rashes or lesions  Psychiatric: Patient has a normal mood and affect.        Recent Results (from the past 24 hour(s))   Urinalysis-UC if Indicated   Result Value Ref Range    Color, UA Yellow Colorless, Yellow, Straw, Light Yellow    Clarity, UA Clear Clear    Glucose, UA Negative Negative    Bilirubin, UA Negative Negative    Ketones, UA Negative Negative    Specific Gravity, UA 1.015 1.005 - 1.030    Blood, UA Negative Negative    pH, UA 7.5 5.0 - 8.0    Protein, UA Negative Negative mg/dL    Urobilinogen, UA 0.2 E.U./dL 0.2 E.U./dL, 1.0 E.U./dL    Nitrite, UA Negative Negative    Leukocytes, UA  Negative Negative   Pregnancy (Beta-hCG, Qual), Urine   Result Value Ref Range    Pregnancy Test, Urine Negative Negative    Specific Gravity, UA 1.015 1.001 - 1.030   HM1 (CBC with Diff)   Result Value Ref Range    WBC 4.7 4.0 - 11.0 thou/uL    RBC 4.28 3.80 - 5.40 mill/uL    Hemoglobin 13.4 12.0 - 16.0 g/dL    Hematocrit 39.4 35.0 - 47.0 %    MCV 92 80 - 100 fL    MCH 31.2 27.0 - 34.0 pg    MCHC 34.0 32.0 - 36.0 g/dL    RDW 11.6 11.0 - 14.5 %    Platelets 294 140 - 440 thou/uL    MPV 8.1 7.0 - 10.0 fL    Neutrophils % 55 50 - 70 %    Lymphocytes % 36 20 - 40 %    Monocytes % 7 2 - 10 %    Eosinophils % 2 0 - 6 %    Basophils % 1 0 - 2 %    Neutrophils Absolute 2.6 2.0 - 7.7 thou/uL    Lymphocytes Absolute 1.7 0.8 - 4.4 thou/uL    Monocytes Absolute 0.3 0.0 - 0.9 thou/uL    Eosinophils Absolute 0.1 0.0 - 0.4 thou/uL    Basophils Absolute 0.0 0.0 - 0.2 thou/uL

## 2021-06-19 NOTE — LETTER
Letter by Aureliano Morrell CNP at      Author: Aureliano Morrell CNP Service: -- Author Type: --    Filed:  Encounter Date: 7/8/2019 Status: (Other)         Marisabel Healy  [address] [facility] [address]             July 8, 2019         Dear Ms. Healy,    Below are the results from your recent visit:    Resulted Orders   Comprehensive Metabolic Panel   Result Value Ref Range    Sodium 143 136 - 145 mmol/L    Potassium 3.9 3.5 - 5.0 mmol/L    Chloride 106 98 - 107 mmol/L    CO2 27 22 - 31 mmol/L    Anion Gap, Calculation 10 5 - 18 mmol/L    Glucose 94 70 - 125 mg/dL    BUN 15 8 - 22 mg/dL    Creatinine 0.80 0.60 - 1.10 mg/dL    GFR MDRD Af Amer >60 >60 mL/min/1.73m2    GFR MDRD Non Af Amer >60 >60 mL/min/1.73m2    Bilirubin, Total 0.4 0.0 - 1.0 mg/dL    Calcium 10.6 (H) 8.5 - 10.5 mg/dL    Protein, Total 7.5 6.0 - 8.0 g/dL    Albumin 4.3 3.5 - 5.0 g/dL    Alkaline Phosphatase 63 45 - 120 U/L    AST 19 0 - 40 U/L    ALT 33 0 - 45 U/L    Narrative    Fasting Glucose reference range is 70-99 mg/dL per  American Diabetes Association (ADA) guidelines.   Celiac(Gluten)Antibody Panel   Result Value Ref Range    Gliadin IgA 1.2 0.0-<7.0 U/mL    Gliadin IgG <0.4 0.0-<7.0 U/mL    Tissue Transglutaminase IgG AB <0.6 0.0-<7.0 U/mL    Tissue Transglutaminase IgA AB 0.3 0.0-<7.0 U/mL    Immunoglobulin A 196 65 - 400 mg/dL    Narrative      < 7 U/mL = Negative    7-10 U/mL = Equivocal    > 10 U/mL = Positive    Positive results for the tTG and/or gliadin antibodies indicate possible celiac disease and a small intestinal biopsy my be indicated. Antibody levels decrease in patients on gluten-free diets; therefore, negative results do not exclude celiac disease. Total serum IgA is measured to identify selective IgA deficiency, which is present in up to 10% of celiac disease patients. Such patients would have negative results on IgA assays, but may have positive results on IgG antibody assays.   HM1 (CBC with Diff)   Result  Value Ref Range    WBC 6.9 4.0 - 11.0 thou/uL    RBC 4.75 3.80 - 5.40 mill/uL    Hemoglobin 14.5 12.0 - 16.0 g/dL    Hematocrit 43.3 35.0 - 47.0 %    MCV 91 80 - 100 fL    MCH 30.5 27.0 - 34.0 pg    MCHC 33.5 32.0 - 36.0 g/dL    RDW 11.6 11.0 - 14.5 %    Platelets 289 140 - 440 thou/uL    MPV 10.6 8.5 - 12.5 fL    Neutrophils % 63 50 - 70 %    Lymphocytes % 28 20 - 40 %    Monocytes % 7 2 - 10 %    Eosinophils % 2 0 - 6 %    Basophils % 1 0 - 2 %    Neutrophils Absolute 4.3 2.0 - 7.7 thou/uL    Lymphocytes Absolute 2.0 0.8 - 4.4 thou/uL    Monocytes Absolute 0.5 0.0 - 0.9 thou/uL    Eosinophils Absolute 0.1 0.0 - 0.4 thou/uL    Basophils Absolute 0.0 0.0 - 0.2 thou/uL       Liver enzymes, kidney labs, blood counts, and electrolytes are all normal.    Celiac testing is negative.    Continue with daily fiber supplement as originally discussed.  Consider daily yogurt.    If symptoms are not much improved after 2 weeks, I can place referral to [facility] for further evaluation and testing.  Call clinic if you would like referral.    Please call with questions or contact us using [employer].    Sincerely,        Electronically signed by Aureliano Morrell CNP

## 2021-06-19 NOTE — LETTER
Letter by Andrae De Dios MD at      Author: Andrae De Dios MD Service: -- Author Type: --    Filed:  Encounter Date: 8/28/2019 Status: (Other)         Marisabel Healy  [address] [facility] [address]             August 28, 2019         Dear Ms. Healy,    Our records indicate that you are due for a cervical cancer screen/pap screen.  Please call the clinic or use my chart and schedule an office visit to complete this.  If you have had this done somewhere other than [facility], please help us obtain a copy of your testing/results so we can update your records.  You can also just let us know when and where you had the testing completed through a phone call or through my chart.The records can be faxed to us at [phone].    Please call with questions or contact us using [employer].    Sincerely,        Electronically signed by Andrae De Dios MD

## 2021-07-03 NOTE — ADDENDUM NOTE
Addendum Note by Sherrell Morrell CNP at 1/23/2018  8:40 AM     Author: Sherrell Morrell CNP Service: -- Author Type: Nurse Practitioner    Filed: 1/23/2018  8:40 AM Encounter Date: 1/22/2018 Status: Signed    : Sherrell Morrell CNP (Nurse Practitioner)    Addended by: SHERRELL MORRELL on: 1/23/2018 08:40 AM        Modules accepted: Orders

## 2021-10-18 ENCOUNTER — TRANSFERRED RECORDS (OUTPATIENT)
Dept: HEALTH INFORMATION MANAGEMENT | Facility: CLINIC | Age: 36
End: 2021-10-18

## 2025-02-04 ENCOUNTER — TRANSFERRED RECORDS (OUTPATIENT)
Dept: HEALTH INFORMATION MANAGEMENT | Facility: CLINIC | Age: 40
End: 2025-02-04

## 2025-05-05 ENCOUNTER — TRANSCRIBE ORDERS (OUTPATIENT)
Dept: OTHER | Age: 40
End: 2025-05-05

## 2025-05-05 ENCOUNTER — MEDICAL CORRESPONDENCE (OUTPATIENT)
Dept: HEALTH INFORMATION MANAGEMENT | Facility: CLINIC | Age: 40
End: 2025-05-05
Payer: COMMERCIAL

## 2025-05-05 DIAGNOSIS — L65.9 HAIR LOSS: ICD-10-CM

## 2025-05-05 DIAGNOSIS — R63.5 WEIGHT GAIN: Primary | ICD-10-CM

## 2025-05-06 ENCOUNTER — PATIENT OUTREACH (OUTPATIENT)
Dept: CARE COORDINATION | Facility: CLINIC | Age: 40
End: 2025-05-06
Payer: COMMERCIAL

## 2025-05-08 ENCOUNTER — PATIENT OUTREACH (OUTPATIENT)
Dept: CARE COORDINATION | Facility: CLINIC | Age: 40
End: 2025-05-08
Payer: COMMERCIAL